# Patient Record
Sex: FEMALE | Race: WHITE | NOT HISPANIC OR LATINO | URBAN - METROPOLITAN AREA
[De-identification: names, ages, dates, MRNs, and addresses within clinical notes are randomized per-mention and may not be internally consistent; named-entity substitution may affect disease eponyms.]

---

## 2022-02-07 ENCOUNTER — MART 1 (OUTPATIENT)
Dept: URBAN - METROPOLITAN AREA CLINIC 25 | Facility: CLINIC | Age: 37
Setting detail: DERMATOLOGY
End: 2022-02-07

## 2022-02-07 DIAGNOSIS — L57.0 ACTINIC KERATOSIS: ICD-10-CM

## 2022-02-07 PROBLEM — D22.9 MELANOCYTIC NEVI, UNSPECIFIED: Status: ACTIVE | Noted: 2022-02-07

## 2022-02-07 PROBLEM — D03.22 MELANOMA IN SITU OF LEFT EAR AND EXTERNAL AURICULAR CANAL: Status: ACTIVE | Noted: 2022-02-07

## 2022-02-07 PROBLEM — D22.9 MELANOCYTIC NEVI, UNSPECIFIED: Status: RESOLVED | Noted: 2022-02-07

## 2022-02-07 PROBLEM — D03.22 MELANOMA IN SITU OF LEFT EAR AND EXTERNAL AURICULAR CANAL: Status: RESOLVED | Noted: 2022-02-07

## 2022-02-07 PROCEDURE — 99204 OFFICE O/P NEW MOD 45 MIN: CPT

## 2022-02-07 PROCEDURE — 88342 IMHCHEM/IMCYTCHM 1ST ANTB: CPT

## 2022-02-07 PROCEDURE — 17311 MOHS 1 STAGE H/N/HF/G: CPT

## 2022-02-16 PROBLEM — D03.39 MELANOMA IN SITU OF OTHER PARTS OF FACE: Status: RESOLVED | Noted: 2022-02-16

## 2023-05-09 ENCOUNTER — APPOINTMENT (OUTPATIENT)
Dept: URBAN - METROPOLITAN AREA CLINIC 197 | Age: 38
Setting detail: DERMATOLOGY
End: 2023-05-11
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
## 2023-05-09 DIAGNOSIS — Z86.006 PERSONAL HISTORY OF MELANOMA IN-SITU: ICD-10-CM

## 2023-05-09 DIAGNOSIS — L73.8 OTHER SPECIFIED FOLLICULAR DISORDERS: ICD-10-CM

## 2023-05-09 DIAGNOSIS — D18.0 HEMANGIOMA: ICD-10-CM

## 2023-05-09 DIAGNOSIS — Z71.89 OTHER SPECIFIED COUNSELING: ICD-10-CM

## 2023-05-09 DIAGNOSIS — D22 MELANOCYTIC NEVI: ICD-10-CM

## 2023-05-09 PROBLEM — D22.5 MELANOCYTIC NEVI OF TRUNK: Status: ACTIVE | Noted: 2023-05-09
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
## 2023-05-09 PROBLEM — D18.01 HEMANGIOMA OF SKIN AND SUBCUTANEOUS TISSUE: Status: ACTIVE | Noted: 2023-05-09

## 2023-05-09 PROCEDURE — OTHER COUNSELING: OTHER

## 2023-05-09 PROCEDURE — 99213 OFFICE O/P EST LOW 20 MIN: CPT

## 2023-05-09 PROCEDURE — OTHER MIPS QUALITY: OTHER

## 2023-05-09 ASSESSMENT — LOCATION SIMPLE DESCRIPTION DERM
LOCATION SIMPLE: ABDOMEN
LOCATION SIMPLE: CHEST
LOCATION SIMPLE: LEFT CHEEK

## 2023-05-09 ASSESSMENT — LOCATION ZONE DERM
LOCATION ZONE: FACE
LOCATION ZONE: TRUNK

## 2023-05-09 ASSESSMENT — LOCATION DETAILED DESCRIPTION DERM
LOCATION DETAILED: LEFT MEDIAL SUPERIOR CHEST
LOCATION DETAILED: RIGHT RIB CAGE
LOCATION DETAILED: PERIUMBILICAL SKIN
LOCATION DETAILED: LEFT MEDIAL MALAR CHEEK

## 2023-05-09 NOTE — PROCEDURE: MIPS QUALITY
Quality 138: Melanoma: Coordination Of Care: A treatment plan was communicated to the physicians providing continuing care within one month of diagnosis outlining: diagnosis, tumor thickness and a plan for surgery or alternate care.
Quality 137: Melanoma: Continuity Of Care - Recall System: Patient information entered into a recall system that includes: target date for the next exam specified AND a process to follow up with patients regarding missed or unscheduled appointments
Detail Level: Detailed
Quality 226: Preventive Care And Screening: Tobacco Use: Screening And Cessation Intervention: Patient screened for tobacco use and is an ex/non-smoker

## 2023-05-09 NOTE — HPI: FULL BODY SKIN EXAMINATION
How Severe Are Your Spot(S)?: moderate
What Type Of Note Output Would You Prefer (Optional)?: Bullet Format
What Is The Reason For Today's Visit?: Full Body Skin Examination
What Is The Reason For Today's Visit? (Being Monitored For X): concerning skin lesions on an annual basis
Additional History: Pt here for a second opinion \\n